# Patient Record
Sex: MALE | Race: BLACK OR AFRICAN AMERICAN
[De-identification: names, ages, dates, MRNs, and addresses within clinical notes are randomized per-mention and may not be internally consistent; named-entity substitution may affect disease eponyms.]

---

## 2017-03-31 NOTE — PD
Physical Exam


Date Seen by Provider:  Mar 31, 2017


Time Seen by Provider:  09:30


Narrative


Pt is a 41 year old male presenting to the ED with c/o toothache. Pt states its 

his left upper tooth. This has been ongoing for 1 month. Pt has not been 

evaluated by a dentist. Pt states the tooth is broken and chipped. He denies 

any fevers. He states it is aching, and the pain is a 6/10. Pt awaiting bed 

placement. VSS.





Data


Data


Last Documented VS





Vital Signs








  Date Time  Temp Pulse Resp B/P Pulse Ox O2 Delivery O2 Flow Rate FiO2


 


3/31/17 09:25 97.5 72 20 150/90 97 Room Air  











[facility]


Supervised Visit with KWESI:  Juanis Lovell Mar 31, 2017 09:33

## 2017-12-04 ENCOUNTER — HOSPITAL ENCOUNTER (EMERGENCY)
Dept: HOSPITAL 17 - NEPD | Age: 42
Discharge: HOME | End: 2017-12-04
Payer: COMMERCIAL

## 2017-12-04 VITALS
TEMPERATURE: 98.6 F | OXYGEN SATURATION: 98 % | RESPIRATION RATE: 18 BRPM | DIASTOLIC BLOOD PRESSURE: 83 MMHG | SYSTOLIC BLOOD PRESSURE: 144 MMHG | HEART RATE: 80 BPM

## 2017-12-04 DIAGNOSIS — J45.909: ICD-10-CM

## 2017-12-04 DIAGNOSIS — F17.200: ICD-10-CM

## 2017-12-04 DIAGNOSIS — H00.011: Primary | ICD-10-CM

## 2017-12-04 PROCEDURE — 99283 EMERGENCY DEPT VISIT LOW MDM: CPT

## 2017-12-04 NOTE — PD
HPI


Chief Complaint:  Eye Problems/Injury


Time Seen by Provider:  08:58


Travel History


International Travel<30 days:  No


Contact w/Intl Traveler<30days:  No


Traveled to known affect area:  No





History of Present Illness


HPI


42-year-old male presents to the emergency Department with complaint of right 

upper eyelid pain, swelling, redness 2 days.  Reports waking up in the 

mornings with his eyes crusted shut.  Reports purulent drainage from his right 

eye throughout the day.  Denies fever, vomiting.  Denies change in vision.  

Denies eye trauma.  Has not taken any medications or tried any treatments to 

alleviate symptoms.  Symptoms are mild in severity.  No known relieving or 

aggravating factors.  No one else with similar symptoms.  No known allergies.  

Has no other medical complaints.  No other modifying factors or associated 

signs and symptoms.





PFSH


Past Medical History


Respiratory:  Yes (asthma)





Social History


Alcohol Use:  No


Tobacco Use:  Yes (1 ppd)


Substance Use:  No





Allergies-Medications


(Allergen,Severity, Reaction):  


Coded Allergies:  


     No Known Allergies (Unverified , 9/4/16)


Reported Meds & Prescriptions





Reported Meds & Active Scripts


Active


Polytrim Opth Drops (Polymyxin/Trimethoprim Sulfate) 10,000-0.1 Unit/Ml-% Soln 

2 Drop RIGHT EYE Q6HR 7 Days








Review of Systems


Except as stated in HPI:  all other systems reviewed are Neg





Physical Exam


Narrative


GENERAL: Well-nourished, well-developed  male patient, in no acute 

distress


SKIN: Warm and dry.


HEAD: Atraumatic. Normocephalic. 


EYES: Pupils equal and round at 3 mm with brisk reaction.   PERRLA.  EOMI. 

Right lid eversion with no foreign body noted.  Right eye without scleral 

erythema; upper ey lid with minimal edema and stye palpated to the medial aspect

; with erythema.  No orbital tenderness, erythema or cellulitis.  Right eye 

without photophobia.  No consensual photophobia.   No scleral icterus. Right 

eye purulent drainage noted.


ENT: Mucosa pink and moist.  Airway patent.  


NECK: Trachea midline.  


CARDIOVASCULAR: Regular rate.


RESPIRATORY: No accessory muscle use. 


GASTROINTESTINAL:  Rounded.


NEUROLOGICAL: Awake and alert.  Oriented 3.  No obvious cranial nerve 

deficits.  Motor grossly within normal limits. Normal speech. 


PSYCHIATRIC: Appropriate mood and affect; insight and judgment normal.





Data


Data


Last Documented VS





Vital Signs








  Date Time  Temp Pulse Resp B/P (MAP) Pulse Ox O2 Delivery O2 Flow Rate FiO2


 


12/4/17 09:14        


 


12/4/17 08:30 98.6 80 18  98   








Orders





 Orders


Ed Discharge Order (12/4/17 09:03)








MDM


Medical Decision Making


Medical Screen Exam Complete:  Yes


Emergency Medical Condition:  Yes


Medical Record Reviewed:  Yes


Differential Diagnosis


Stye, conjunctivitis, chalazion


Narrative Course


42-year-old male physical exam consistent with a sternal stye to the right 

upper eyelid.  She is afebrile and nontoxic-appearing.  Denies fever, vomiting.

  Polytrim eye drops prescribed for home.  Instructed patient to do warm 

compresses.  Instructed patient to follow up with primary care provider.  

Patient verbalizes understanding and agreement with treatment plan.  Patient is 

medically cleared and stable for discharge.  Discussed reasons to return to the 

emergency department.  Patient agrees with treatment plan.  The patients vital 

signs are stable and the patient is stable for outpatient follow-up and 

treatment.  Patient discharged home, stable and in no acute distress.





Diagnosis





 Primary Impression:  


 Hordeolum externum of right upper eyelid


Referrals:  


Primary Care Physician


Patient Instructions:  General Instructions, Stye (ED)





***Additional Instructions:  


Do not try to pop the sty or squeeze pus from the sty


Clean-air eyelid gently with mild soap and water


Warm compresses to affected eye 2-3 times daily to encourage the sty to drain 

on its own


Keep your eye clean; don't wear makeup


Do not wear contact lenses; go without contact lenses until this diagnosis away


Follow-up with ophthalmology as needed


Follow-up with primary care provider


Return to the emergency department immediately with worsening of symptoms


***Med/Other Pt SpecificInfo:  Prescription(s) given


Scripts


Polymyxin B-Trimethoprim Opth Drops (Polytrim Opth Drops) 10,000-0.1 Unit/Ml-% 

Soln


2 DROP RIGHT EYE Q6HR for Mgmt Bacterial Infection for 7 Days, #1 BOTTLE 0 

Refills


   Prov: Yesi Posadas         12/4/17


Disposition:  01 DISCHARGE HOME


Condition:  Stable











Yesi Posadas Dec 4, 2017 09:01

## 2018-04-18 ENCOUNTER — HOSPITAL ENCOUNTER (EMERGENCY)
Dept: HOSPITAL 17 - NEPD | Age: 43
Discharge: HOME | End: 2018-04-18
Payer: COMMERCIAL

## 2018-04-18 VITALS — SYSTOLIC BLOOD PRESSURE: 147 MMHG | DIASTOLIC BLOOD PRESSURE: 88 MMHG

## 2018-04-18 VITALS
RESPIRATION RATE: 16 BRPM | DIASTOLIC BLOOD PRESSURE: 102 MMHG | TEMPERATURE: 98.9 F | SYSTOLIC BLOOD PRESSURE: 153 MMHG | HEART RATE: 86 BPM | OXYGEN SATURATION: 97 %

## 2018-04-18 VITALS — WEIGHT: 242.51 LBS | BODY MASS INDEX: 36.75 KG/M2 | HEIGHT: 68 IN
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
## 2018-04-18 DIAGNOSIS — S16.1XXA: Primary | ICD-10-CM

## 2018-04-18 DIAGNOSIS — S39.012A: ICD-10-CM

## 2018-04-18 PROCEDURE — 99283 EMERGENCY DEPT VISIT LOW MDM: CPT
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
